# Patient Record
Sex: MALE | Race: WHITE | NOT HISPANIC OR LATINO | Employment: FULL TIME | RURAL
[De-identification: names, ages, dates, MRNs, and addresses within clinical notes are randomized per-mention and may not be internally consistent; named-entity substitution may affect disease eponyms.]

---

## 2017-03-17 ENCOUNTER — OFFICE VISIT (OUTPATIENT)
Dept: RETAIL CLINIC | Facility: CLINIC | Age: 32
End: 2017-03-17

## 2017-03-17 VITALS
OXYGEN SATURATION: 97 % | RESPIRATION RATE: 18 BRPM | HEART RATE: 95 BPM | TEMPERATURE: 98.8 F | WEIGHT: 185.4 LBS | BODY MASS INDEX: 29.04 KG/M2

## 2017-03-17 DIAGNOSIS — J10.1 INFLUENZA A: Primary | ICD-10-CM

## 2017-03-17 DIAGNOSIS — J02.0 STREP PHARYNGITIS: ICD-10-CM

## 2017-03-17 LAB
EXPIRATION DATE: ABNORMAL
EXPIRATION DATE: NORMAL
FLUAV AG NPH QL: NORMAL
FLUBV AG NPH QL: NORMAL
INTERNAL CONTROL: ABNORMAL
INTERNAL CONTROL: NORMAL
Lab: ABNORMAL
Lab: NORMAL
S PYO AG THROAT QL: POSITIVE

## 2017-03-17 PROCEDURE — 87880 STREP A ASSAY W/OPTIC: CPT | Performed by: NURSE PRACTITIONER

## 2017-03-17 PROCEDURE — 99203 OFFICE O/P NEW LOW 30 MIN: CPT | Performed by: NURSE PRACTITIONER

## 2017-03-17 PROCEDURE — 87804 INFLUENZA ASSAY W/OPTIC: CPT | Performed by: NURSE PRACTITIONER

## 2017-03-17 RX ORDER — OMEPRAZOLE 20 MG/1
20 CAPSULE, DELAYED RELEASE ORAL DAILY
COMMUNITY

## 2017-03-17 RX ORDER — OSELTAMIVIR PHOSPHATE 75 MG/1
75 CAPSULE ORAL 2 TIMES DAILY
Qty: 10 CAPSULE | Refills: 0 | Status: SHIPPED | OUTPATIENT
Start: 2017-03-17 | End: 2017-03-22

## 2017-03-17 RX ORDER — ALBUTEROL SULFATE 90 UG/1
2 AEROSOL, METERED RESPIRATORY (INHALATION) EVERY 4 HOURS PRN
Qty: 1 INHALER | Refills: 0 | Status: SHIPPED | OUTPATIENT
Start: 2017-03-17 | End: 2017-04-16

## 2017-03-17 RX ORDER — AMOXICILLIN AND CLAVULANATE POTASSIUM 875; 125 MG/1; MG/1
1 TABLET, FILM COATED ORAL 2 TIMES DAILY
Qty: 20 TABLET | Refills: 0 | Status: SHIPPED | OUTPATIENT
Start: 2017-03-17 | End: 2017-03-27

## 2017-03-17 RX ORDER — DEXTROMETHORPHAN HYDROBROMIDE AND PROMETHAZINE HYDROCHLORIDE 15; 6.25 MG/5ML; MG/5ML
5 SYRUP ORAL 4 TIMES DAILY PRN
Qty: 120 ML | Refills: 0 | Status: SHIPPED | OUTPATIENT
Start: 2017-03-17 | End: 2017-03-27

## 2017-03-17 RX ORDER — IBUPROFEN 200 MG
200 TABLET ORAL EVERY 6 HOURS PRN
COMMUNITY

## 2017-03-17 NOTE — PROGRESS NOTES
Subjective   Shaan Dominguez is a 31 y.o. male.   Chief Complaint   Patient presents with   • Sore Throat   • Flu Symptoms      Sore Throat    This is a new problem. The current episode started yesterday. The problem has been gradually worsening. Associated symptoms include congestion, coughing, headaches and trouble swallowing. He has tried acetaminophen for the symptoms. The treatment provided no relief.   Flu Symptoms   This is a new problem. The current episode started yesterday. The problem occurs constantly. Associated symptoms include chills, congestion, coughing, fatigue, a fever, headaches, myalgias and a sore throat. Exacerbated by: no flu shot this season. He has tried acetaminophen for the symptoms. The treatment provided no relief.        The following portions of the patient's history were reviewed and updated as appropriate: allergies, current medications, past family history, past medical history, past social history, past surgical history and problem list.    Review of Systems   Constitutional: Positive for chills, fatigue and fever.   HENT: Positive for congestion, sore throat and trouble swallowing.    Eyes: Negative.    Respiratory: Positive for cough and wheezing.    Gastrointestinal: Negative.    Genitourinary: Negative.    Musculoskeletal: Positive for myalgias.   Skin: Negative.    Allergic/Immunologic: Negative.    Neurological: Positive for headaches.   Psychiatric/Behavioral: Negative.    All other systems reviewed and are negative.      Objective   No Known Allergies    Physical Exam   Constitutional: He is oriented to person, place, and time. He appears well-developed and well-nourished. He appears ill.   HENT:   Right Ear: Tympanic membrane normal.   Left Ear: Tympanic membrane normal.   Nose: Mucosal edema present.   Mouth/Throat: Posterior oropharyngeal erythema present. No oropharyngeal exudate.   Eyes: Pupils are equal, round, and reactive to light.   Neck: Neck supple.    Cardiovascular: Normal rate and regular rhythm.    Pulmonary/Chest: Effort normal. No respiratory distress. He has wheezes. He has rhonchi.   Abdominal: Soft. Bowel sounds are normal.   Musculoskeletal: Normal range of motion.   Lymphadenopathy:     He has cervical adenopathy.   Neurological: He is alert and oriented to person, place, and time.   Skin: Skin is warm and dry.   Psychiatric: He has a normal mood and affect.   Vitals reviewed.      Assessment/Plan   Shaan was seen today for sore throat and flu symptoms.    Diagnoses and all orders for this visit:    Influenza A  -     POC Influenza A / B    Strep pharyngitis  -     POC Rapid Strep A    Other orders  -     oseltamivir (TAMIFLU) 75 MG capsule; Take 1 capsule by mouth 2 (Two) Times a Day for 5 days.  -     amoxicillin-clavulanate (AUGMENTIN) 875-125 MG per tablet; Take 1 tablet by mouth 2 (Two) Times a Day for 10 days.  -     albuterol (PROVENTIL HFA;VENTOLIN HFA) 108 (90 BASE) MCG/ACT inhaler; Inhale 2 puffs Every 4 (Four) Hours As Needed for Wheezing or Shortness of Air for up to 30 days.  -     promethazine-dextromethorphan (PROMETHAZINE-DM) 6.25-15 MG/5ML syrup; Take 5 mL by mouth 4 (Four) Times a Day As Needed for Cough for up to 10 days.        Results for orders placed or performed in visit on 03/17/17   POC Influenza A / B   Result Value Ref Range    Rapid Influenza A Ag pos     Rapid Influenza B Ag neg     Internal Control Passed Passed    Lot Number [id number]     Expiration Date 11/2018    POC Rapid Strep A   Result Value Ref Range    Rapid Strep A Screen Positive (A) Negative, VALID, INVALID, Not Performed    Internal Control Passed Passed    Lot Number [id number]     Expiration Date 8/2018               This document has been electronically signed by KALPESH Banks March 17, 2017 5:56 PM

## 2017-03-17 NOTE — PATIENT INSTRUCTIONS
Influenza, Adult  Influenza (flu) is an infection in the mouth, nose, and throat (respiratory tract) caused by a virus. The flu can make you feel very ill. Influenza spreads easily from person to person (contagious).   HOME CARE   · Only take medicines as told by your doctor.  · Use a cool mist humidifier to make breathing easier.  · Get plenty of rest until your fever goes away. This usually takes 3 to 4 days.  · Drink enough fluids to keep your pee (urine) clear or pale yellow.  · Cover your mouth and nose when you cough or sneeze.  · Wash your hands well to avoid spreading the flu.  · Stay home from work or school until your fever has been gone for at least 1 full day.  · Get a flu shot every year.  GET HELP RIGHT AWAY IF:   · You have trouble breathing or feel short of breath.  · Your skin or nails turn blue.  · You have severe neck pain or stiffness.  · You have a severe headache, facial pain, or earache.  · Your fever gets worse or keeps coming back.  · You feel sick to your stomach (nauseous), throw up (vomit), or have watery poop (diarrhea).  · You have chest pain.  · You have a deep cough that gets worse, or you cough up more thick spit (mucus).  MAKE SURE YOU:   · Understand these instructions.  · Will watch your condition.  · Will get help right away if you are not doing well or get worse.     This information is not intended to replace advice given to you by your health care provider. Make sure you discuss any questions you have with your health care provider.     Document Released: 09/26/2009 Document Revised: 01/08/2016 Document Reviewed: 10/11/2016  [employer] Interactive Patient Education ©2016 [employer]    Strep Throat  Strep throat is an infection of the throat. It is caused by germs. Strep throat spreads from person to person because of coughing, sneezing, or close contact.  HOME CARE  Medicines   · Take over-the-counter and prescription medicines only as told by your doctor.  · Take your  antibiotic medicine as told by your doctor. Do not stop taking the medicine even if you feel better.  · Have family members who also have a sore throat or fever go to a doctor.  Eating and Drinking   · Do not share food, drinking cups, or personal items.  · Try eating soft foods until your sore throat feels better.  · Drink enough fluid to keep your pee (urine) clear or pale yellow.  General Instructions  · Rinse your mouth (gargle) with a salt-water mixture 3-4 times per day or as needed. To make a salt-water mixture, stir ½-1 tsp of salt into 1 cup of warm water.  · Make sure that all people in your house wash their hands well.  · Rest.  · Stay home from school or work until you have been taking antibiotics for 24 hours.  · Keep all follow-up visits as told by your doctor. This is important.  GET HELP IF:  · Your neck keeps getting bigger.  · You get a rash, cough, or earache.  · You cough up thick liquid that is green, yellow-brown, or bloody.  · You have pain that does not get better with medicine.  · Your problems get worse instead of getting better.  · You have a fever.  GET HELP RIGHT AWAY IF:  · You throw up (vomit).  · You get a very bad headache.  · You neck hurts or it feels stiff.  · You have chest pain or you are short of breath.  · You have drooling, very bad throat pain, or changes in your voice.  · Your neck is swollen or the skin gets red and tender.  · Your mouth is dry or you are peeing less than normal.  · You keep feeling more tired or it is hard to wake up.  · Your joints are red or they hurt.     This information is not intended to replace advice given to you by your health care provider. Make sure you discuss any questions you have with your health care provider.     Document Released: 06/05/2009 Document Revised: 09/07/2016 Document Reviewed: 04/11/2016  [employer] Interactive Patient Education ©2016 [employer]

## 2018-04-26 ENCOUNTER — TRANSCRIBE ORDERS (OUTPATIENT)
Dept: ADMINISTRATIVE | Facility: HOSPITAL | Age: 33
End: 2018-04-26

## 2018-04-26 DIAGNOSIS — R05.9 COUGH: Primary | ICD-10-CM

## 2018-06-01 ENCOUNTER — HOSPITAL ENCOUNTER (OUTPATIENT)
Dept: RESPIRATORY THERAPY | Facility: HOSPITAL | Age: 33
Discharge: HOME | End: 2018-06-01
Admitting: PHYSICIAN ASSISTANT

## 2018-06-01 DIAGNOSIS — R05.9 COUGH: ICD-10-CM

## 2018-06-01 PROCEDURE — 94010 BREATHING CAPACITY TEST: CPT

## 2018-06-01 PROCEDURE — 94010 BREATHING CAPACITY TEST: CPT | Performed by: INTERNAL MEDICINE

## 2018-06-19 ENCOUNTER — OFFICE VISIT (OUTPATIENT)
Dept: RETAIL CLINIC | Facility: CLINIC | Age: 33
End: 2018-06-19

## 2018-06-19 VITALS
HEART RATE: 85 BPM | WEIGHT: 203 LBS | BODY MASS INDEX: 31.79 KG/M2 | TEMPERATURE: 97.9 F | OXYGEN SATURATION: 98 % | RESPIRATION RATE: 20 BRPM

## 2018-06-19 DIAGNOSIS — J01.00 ACUTE MAXILLARY SINUSITIS, RECURRENCE NOT SPECIFIED: ICD-10-CM

## 2018-06-19 DIAGNOSIS — J40 BRONCHITIS: Primary | ICD-10-CM

## 2018-06-19 PROCEDURE — 99213 OFFICE O/P EST LOW 20 MIN: CPT | Performed by: NURSE PRACTITIONER

## 2018-06-19 RX ORDER — ALBUTEROL SULFATE 90 UG/1
2 AEROSOL, METERED RESPIRATORY (INHALATION) EVERY 4 HOURS PRN
Qty: 1 INHALER | Refills: 0 | Status: SHIPPED | OUTPATIENT
Start: 2018-06-19 | End: 2018-07-19

## 2018-06-19 RX ORDER — MONTELUKAST SODIUM 10 MG/1
10 TABLET ORAL NIGHTLY
COMMUNITY

## 2018-06-19 RX ORDER — AMOXICILLIN AND CLAVULANATE POTASSIUM 875; 125 MG/1; MG/1
1 TABLET, FILM COATED ORAL 2 TIMES DAILY
Qty: 20 TABLET | Refills: 0 | Status: SHIPPED | OUTPATIENT
Start: 2018-06-19 | End: 2018-06-29

## 2018-06-19 RX ORDER — PREDNISONE 10 MG/1
TABLET ORAL
Qty: 21 TABLET | Refills: 0 | Status: SHIPPED | OUTPATIENT
Start: 2018-06-19

## 2018-06-19 RX ORDER — BENZONATATE 100 MG/1
100 CAPSULE ORAL 3 TIMES DAILY PRN
Qty: 30 CAPSULE | Refills: 0 | Status: SHIPPED | OUTPATIENT
Start: 2018-06-19 | End: 2018-06-29

## 2018-06-19 NOTE — PATIENT INSTRUCTIONS
Sinusitis, Adult  Sinusitis is soreness and inflammation of your sinuses. Sinuses are hollow spaces in the bones around your face. They are located:  · Around your eyes.  · In the middle of your forehead.  · Behind your nose.  · In your cheekbones.    Your sinuses and nasal passages are lined with a stringy fluid (mucus). Mucus normally drains out of your sinuses. When your nasal tissues get inflamed or swollen, the mucus can get trapped or blocked so air cannot flow through your sinuses. This lets bacteria, viruses, and funguses grow, and that leads to infection.  Follow these instructions at home:  Medicines  · Take, use, or apply over-the-counter and prescription medicines only as told by your doctor. These may include nasal sprays.  · If you were prescribed an antibiotic medicine, take it as told by your doctor. Do not stop taking the antibiotic even if you start to feel better.  Hydrate and Humidify  · Drink enough water to keep your pee (urine) clear or pale yellow.  · Use a cool mist humidifier to keep the humidity level in your home above 50%.  · Breathe in steam for 10-15 minutes, 3-4 times a day or as told by your doctor. You can do this in the bathroom while a hot shower is running.  · Try not to spend time in cool or dry air.  Rest  · Rest as much as possible.  · Sleep with your head raised (elevated).  · Make sure to get enough sleep each night.  General instructions  · Put a warm, moist washcloth on your face 3-4 times a day or as told by your doctor. This will help with discomfort.  · Wash your hands often with soap and water. If there is no soap and water, use hand .  · Do not smoke. Avoid being around people who are smoking (secondhand smoke).  · Keep all follow-up visits as told by your doctor. This is important.  Contact a doctor if:  · You have a fever.  · Your symptoms get worse.  · Your symptoms do not get better within 10 days.  Get help right away if:  · You have a very bad  headache.  · You cannot stop throwing up (vomiting).  · You have pain or swelling around your face or eyes.  · You have trouble seeing.  · You feel confused.  · Your neck is stiff.  · You have trouble breathing.  This information is not intended to replace advice given to you by your health care provider. Make sure you discuss any questions you have with your health care provider.  Document Released: 06/05/2009 Document Revised: 08/13/2017 Document Reviewed: 10/12/2016  [employer] Interactive Patient Education © 2018 [employer]    Acute Bronchitis, Adult  Acute bronchitis is when air tubes (bronchi) in the lungs suddenly get swollen. The condition can make it hard to breathe. It can also cause these symptoms:  · A cough.  · Coughing up clear, yellow, or green mucus.  · Wheezing.  · Chest congestion.  · Shortness of breath.  · A fever.  · Body aches.  · Chills.  · A sore throat.    Follow these instructions at home:  Medicines  · Take over-the-counter and prescription medicines only as told by your doctor.  · If you were prescribed an antibiotic medicine, take it as told by your doctor. Do not stop taking the antibiotic even if you start to feel better.  General instructions  · Rest.  · Drink enough fluids to keep your pee (urine) clear or pale yellow.  · Avoid smoking and secondhand smoke. If you smoke and you need help quitting, ask your doctor. Quitting will help your lungs heal faster.  · Use an inhaler, cool mist vaporizer, or humidifier as told by your doctor.  · Keep all follow-up visits as told by your doctor. This is important.  How is this prevented?  To lower your risk of getting this condition again:  · Wash your hands often with soap and water. If you cannot use soap and water, use hand .  · Avoid contact with people who have cold symptoms.  · Try not to touch your hands to your mouth, nose, or eyes.  · Make sure to get the flu shot every year.    Contact a doctor if:  · Your symptoms do not  get better in 2 weeks.  Get help right away if:  · You cough up blood.  · You have chest pain.  · You have very bad shortness of breath.  · You become dehydrated.  · You faint (pass out) or keep feeling like you are going to pass out.  · You keep throwing up (vomiting).  · You have a very bad headache.  · Your fever or chills gets worse.  This information is not intended to replace advice given to you by your health care provider. Make sure you discuss any questions you have with your health care provider.  Document Released: 06/05/2009 Document Revised: 07/26/2017 Document Reviewed: 06/07/2017  [employer] Interactive Patient Education © 2018 [employer]

## 2018-06-19 NOTE — PROGRESS NOTES
Subjective   Shaan Dominguez is a 32 y.o. male.   Chief Complaint   Patient presents with   • Bronchitis   • Sinusitis      Bronchitis   This is a recurrent problem. The current episode started in the past 7 days. The problem occurs constantly. The problem has been gradually worsening. Associated symptoms include congestion, coughing and headaches. Associated symptoms comments: Wheezing, chest tightness, sinus drainage. The symptoms are aggravated by exertion. Treatments tried: albuterol inhaler and dulera. The treatment provided mild relief.   Sinusitis   This is a new problem. The current episode started in the past 7 days. The problem has been gradually worsening since onset. There has been no fever. Associated symptoms include congestion, coughing, headaches and sinus pressure. Pertinent negatives include no shortness of breath. Past treatments include nothing. The treatment provided no relief.        The following portions of the patient's history were reviewed and updated as appropriate: allergies, current medications, past family history, past medical history, past social history, past surgical history and problem list.    Review of Systems   Constitutional: Negative.    HENT: Positive for congestion, postnasal drip, sinus pain and sinus pressure.    Eyes: Negative.    Respiratory: Positive for cough, chest tightness and wheezing. Negative for shortness of breath.    Gastrointestinal: Negative.    Genitourinary: Negative.    Skin: Negative.    Allergic/Immunologic: Negative.    Neurological: Positive for headaches.   Psychiatric/Behavioral: Negative.    All other systems reviewed and are negative.      Objective   No Known Allergies    Physical Exam   Constitutional: He is oriented to person, place, and time. He appears well-developed and well-nourished.   HENT:   Right Ear: Tympanic membrane normal.   Left Ear: Tympanic membrane normal.   Nose: Mucosal edema present. Right sinus exhibits maxillary sinus  tenderness. Left sinus exhibits maxillary sinus tenderness.   Mouth/Throat: Posterior oropharyngeal erythema present.   Mucoid cobblestoning   Eyes: Pupils are equal, round, and reactive to light.   Neck: Neck supple.   Cardiovascular: Normal rate and regular rhythm.    Pulmonary/Chest: Effort normal. He has wheezes in the right upper field, the right middle field, the right lower field, the left upper field and the left lower field.   Coarse breath sounds throughout   Neurological: He is alert and oriented to person, place, and time.   Skin: Skin is warm and dry.   Psychiatric: He has a normal mood and affect.   Vitals reviewed.      Assessment/Plan   Shaan was seen today for bronchitis and sinusitis.    Diagnoses and all orders for this visit:    Bronchitis    Acute maxillary sinusitis, recurrence not specified    Other orders  -     amoxicillin-clavulanate (AUGMENTIN) 875-125 MG per tablet; Take 1 tablet by mouth 2 (Two) Times a Day for 10 days.  -     predniSONE (DELTASONE) 10 MG tablet; Take as directed per 6 day pred sunil  -     albuterol (PROVENTIL HFA;VENTOLIN HFA) 108 (90 Base) MCG/ACT inhaler; Inhale 2 puffs Every 4 (Four) Hours As Needed for Wheezing or Shortness of Air (cough) for up to 30 days.  -     benzonatate (TESSALON) 100 MG capsule; Take 1 capsule by mouth 3 (Three) Times a Day As Needed for Cough for up to 10 days.                 This document has been electronically signed by KALPESH Banks June 19, 2018 11:56 AM

## 2018-07-24 ENCOUNTER — OFFICE VISIT (OUTPATIENT)
Dept: PULMONOLOGY | Facility: CLINIC | Age: 33
End: 2018-07-24

## 2018-07-24 VITALS
HEIGHT: 67 IN | DIASTOLIC BLOOD PRESSURE: 88 MMHG | WEIGHT: 209 LBS | HEART RATE: 84 BPM | BODY MASS INDEX: 32.8 KG/M2 | OXYGEN SATURATION: 96 % | SYSTOLIC BLOOD PRESSURE: 135 MMHG

## 2018-07-24 DIAGNOSIS — J41.0 SIMPLE CHRONIC BRONCHITIS (HCC): ICD-10-CM

## 2018-07-24 DIAGNOSIS — G47.33 OSA (OBSTRUCTIVE SLEEP APNEA): Primary | ICD-10-CM

## 2018-07-24 PROCEDURE — 99204 OFFICE O/P NEW MOD 45 MIN: CPT | Performed by: INTERNAL MEDICINE

## 2018-07-24 RX ORDER — ALBUTEROL SULFATE 90 UG/1
2 AEROSOL, METERED RESPIRATORY (INHALATION) EVERY 4 HOURS PRN
Qty: 1 INHALER | Refills: 11 | Status: SHIPPED | OUTPATIENT
Start: 2018-07-24

## 2018-07-24 RX ORDER — BENZONATATE 100 MG/1
100 CAPSULE ORAL 3 TIMES DAILY PRN
COMMUNITY

## 2018-07-24 NOTE — PROGRESS NOTES
Subjective   Shaan Dominguez is a 32 y.o. male who is being seen for Cough    History of Present Illness   This 32-year-old gentleman has been referred to us by his primary care provider for evaluation of ongoing cough and shortness of breath.  Patient tells me that he smokes and had some smoker's cough in the past but now his cough is more pronounced although still nonproductive.  He has developed episodic wheezing on and off since last year.  Now his main problem is chest tightness which usually starts on exertion and later on if the wheezing.  He also mentions about progressively increasing fatigue in the daytime.  Extent that that is interfering with his day-to-day activities.  On questioning he tells me that he snores loudly and has very much fragmented sleep.  Does not feel fresh in the morning time and remains sleepy and tired throughout the day.  The the weekend he tries to take naps.  Past Medical History:   Diagnosis Date   • Bronchitis    • Bronchitis, chronic (CMS/HCC)    • History of ear infections    • History of strep sore throat    • Hypertension      History reviewed. No pertinent surgical history.  Family History   Problem Relation Age of Onset   • Diabetes Mother    • Hypertension Father    • Diabetes Father    • Diabetes Maternal Grandmother    • Diabetes Paternal Grandmother       reports that he has been smoking Cigarettes.  He has a 15.00 pack-year smoking history. His smokeless tobacco use includes Snuff. He reports that he drinks about 9.0 oz of alcohol per week . He reports that he does not use drugs.  No Known Allergies        Patient has never received a flu shot or a pneumonia vaccination.     The following portions of the patient's history were reviewed and updated as appropriate: allergies, current medications, past family history, past medical history, past social history, past surgical history and problem list.    Review of Systems   Constitutional: Negative for appetite change, chills,  "diaphoresis and unexpected weight change.   HENT: Positive for voice change. Negative for sore throat and trouble swallowing.    Eyes: Negative for visual disturbance.   Respiratory: Positive for cough, chest tightness (Occasional), shortness of breath and wheezing. Negative for apnea and choking.    Cardiovascular: Negative for chest pain, palpitations and leg swelling.   Gastrointestinal: Negative for abdominal pain, constipation, diarrhea, nausea and vomiting.   Endocrine: Negative for cold intolerance, heat intolerance, polydipsia, polyphagia and polyuria.   Genitourinary: Negative for difficulty urinating and dysuria.   Musculoskeletal: Negative for gait problem.   Skin: Negative for rash and wound.   Neurological: Negative for syncope and light-headedness.   Hematological: Negative for adenopathy.   Psychiatric/Behavioral: Negative for agitation, behavioral problems and confusion.   All other systems reviewed and are negative.      Objective   /88   Pulse 84   Ht 170.2 cm (67\")   Wt 94.8 kg (209 lb)   SpO2 96%   BMI 32.73 kg/m²   Physical Exam   Constitutional: He is oriented to person, place, and time.   HENT:   Head: Normocephalic and atraumatic.   Nose: Mucosal edema present.   Eyes: Pupils are equal, round, and reactive to light. EOM are normal.   Neck: Neck supple.   Cardiovascular: Normal rate, regular rhythm and normal heart sounds.    Pulmonary/Chest: He has rhonchi.   Vesicular breath sound bilaterally with prolonged expiratory phase   Abdominal: Soft. Bowel sounds are normal.   Musculoskeletal: Normal range of motion. He exhibits no deformity.   Neurological: He is alert and oriented to person, place, and time.   Skin: Skin is warm and dry.   Psychiatric: He has a normal mood and affect. His behavior is normal.   Nursing note and vitals reviewed.        Radiology:  No Images in the past 120 days found..    Lab Results:  Office Visit on 03/17/2017   Component Date Value Ref Range Status   • " Rapid Influenza A Ag 03/17/2017 pos   Final   • Rapid Influenza B Ag 03/17/2017 neg   Final   • Internal Control 03/17/2017 Passed  Passed Final   • Lot Number 03/17/2017 [id number]   Final   • Expiration Date 03/17/2017 11/2018   Final   • Rapid Strep A Screen 03/17/2017 Positive* Negative, VALID, INVALID, Not Performed Final   • Internal Control 03/17/2017 Passed  Passed Final   • Lot Number 03/17/2017 [id number]   Final   • Expiration Date 03/17/2017 8/2018   Final       Assessment      ICD-10-CM ICD-9-CM   1. TMEITOPE (obstructive sleep apnea) G47.33 327.23   2. Simple chronic bronchitis (CMS/HCC) J41.0 491.0                DISCUSSION:  This 32-year-old smoker has symptoms of ongoing shortness of breath, chest tightness, cough and wheezing.  This symptoms are consistent with obstructive airways disease.  He also has coexisting problem off fragmented sleep with increased daytime sleepiness and fatigue.  This may be from underlying obstructive sleep apnea.  We are initiating some workup prior to our management plan.  I'm sending him for a pulmonary function test and a nocturnal polysomnography.  I like to see him again after that.    Patient currently takes albuterol inhaler on a when necessary basis.  I'm adding Breo microgram 1 puff daily for regular maintenance therapy.    Plan    Orders Placed This Encounter   Procedures   • Ambulatory Referral to Sleep Medicine   • Pulmonary Function Test     New Medications Ordered This Visit   Medications   • albuterol (PROAIR HFA) 108 (90 Base) MCG/ACT inhaler     Sig: Inhale 2 puffs Every 4 (Four) Hours As Needed for Shortness of Air.     Dispense:  1 inhaler     Refill:  11   • Fluticasone Furoate-Vilanterol (BREO ELLIPTA) 100-25 MCG/INH aerosol powder      Sig: Inhale 1 puff Daily.     Dispense:  1 each     Refill:  6                  Marichuy Lange MD, FCCP, [facility]  Pulmonary, Critical Care, and Sleep Medicine

## 2021-03-18 ENCOUNTER — BULK ORDERING (OUTPATIENT)
Dept: CASE MANAGEMENT | Facility: OTHER | Age: 36
End: 2021-03-18

## 2021-03-18 DIAGNOSIS — Z23 IMMUNIZATION DUE: ICD-10-CM

## 2024-06-13 ENCOUNTER — APPOINTMENT (OUTPATIENT)
Dept: MRI IMAGING | Facility: HOSPITAL | Age: 39
End: 2024-06-13
Payer: COMMERCIAL

## 2024-06-13 ENCOUNTER — HOSPITAL ENCOUNTER (EMERGENCY)
Facility: HOSPITAL | Age: 39
Discharge: HOME | End: 2024-06-13
Attending: STUDENT IN AN ORGANIZED HEALTH CARE EDUCATION/TRAINING PROGRAM
Payer: COMMERCIAL

## 2024-06-13 VITALS
HEART RATE: 83 BPM | DIASTOLIC BLOOD PRESSURE: 87 MMHG | SYSTOLIC BLOOD PRESSURE: 127 MMHG | OXYGEN SATURATION: 97 % | TEMPERATURE: 97.9 F | RESPIRATION RATE: 17 BRPM | HEIGHT: 67 IN | BODY MASS INDEX: 34.53 KG/M2 | WEIGHT: 220 LBS

## 2024-06-13 DIAGNOSIS — S86.111A GASTROCNEMIUS TEAR, RIGHT, INITIAL ENCOUNTER: Primary | ICD-10-CM

## 2024-06-13 PROCEDURE — 73718 MRI LOWER EXTREMITY W/O DYE: CPT | Performed by: RADIOLOGY

## 2024-06-13 PROCEDURE — 99284 EMERGENCY DEPT VISIT MOD MDM: CPT

## 2024-06-13 PROCEDURE — 73718 MRI LOWER EXTREMITY W/O DYE: CPT

## 2024-06-13 RX ORDER — DICLOFENAC SODIUM 75 MG/1
75 TABLET, DELAYED RELEASE ORAL 2 TIMES DAILY PRN
Qty: 12 TABLET | Refills: 0 | Status: SHIPPED | OUTPATIENT
Start: 2024-06-13

## 2024-06-13 NOTE — ED PROVIDER NOTES
Subjective   History of Present Illness  39 yo male pt with hx of HTN presents to the ED with complaints of right lower leg pain. Pt states that he was hit with a baseball last night.  Pt states he thinks he has tore his calf muscle.  States he has pain with extension and flexion of his foot and ankle. Pt states he has worsening pain with walking too. Rest alleviates the pain. Denies any other injuries or concerns.      History provided by:  Patient   used: No        Review of Systems   Constitutional: Negative.    HENT: Negative.     Eyes: Negative.    Respiratory: Negative.     Cardiovascular: Negative.    Gastrointestinal: Negative.    Endocrine: Negative.    Genitourinary: Negative.    Musculoskeletal:         R leg pain    Skin: Negative.    Allergic/Immunologic: Negative.    Neurological: Negative.    Hematological: Negative.    Psychiatric/Behavioral: Negative.     All other systems reviewed and are negative.      Past Medical History:   Diagnosis Date    Bronchitis     Bronchitis, chronic     History of ear infections     History of strep sore throat     Hypertension        No Known Allergies    No past surgical history on file.    Family History   Problem Relation Age of Onset    Diabetes Mother     Hypertension Father     Diabetes Father     Diabetes Maternal Grandmother     Diabetes Paternal Grandmother        Social History     Socioeconomic History    Marital status:    Tobacco Use    Smoking status: Every Day     Current packs/day: 1.00     Average packs/day: 1 pack/day for 15.0 years (15.0 ttl pk-yrs)     Types: Cigarettes    Smokeless tobacco: Current     Types: Snuff   Substance and Sexual Activity    Alcohol use: Yes     Alcohol/week: 15.0 standard drinks of alcohol     Types: 15 Cans of beer per week    Drug use: No    Sexual activity: Defer     Comment:             Objective   Physical Exam  Vitals and nursing note reviewed.   Constitutional:       Appearance:  Normal appearance. He is normal weight.   HENT:      Head: Normocephalic and atraumatic.      Right Ear: External ear normal.      Left Ear: External ear normal.      Nose: Nose normal.      Mouth/Throat:      Mouth: Mucous membranes are moist.      Pharynx: Oropharynx is clear.   Eyes:      Extraocular Movements: Extraocular movements intact.      Conjunctiva/sclera: Conjunctivae normal.      Pupils: Pupils are equal, round, and reactive to light.   Cardiovascular:      Rate and Rhythm: Normal rate and regular rhythm.      Pulses: Normal pulses.      Heart sounds: Normal heart sounds.   Pulmonary:      Effort: Pulmonary effort is normal.      Breath sounds: Normal breath sounds.   Abdominal:      General: Abdomen is flat. Bowel sounds are normal.      Palpations: Abdomen is soft.   Musculoskeletal:         General: Normal range of motion.      Cervical back: Normal range of motion and neck supple.      Right lower leg: Tenderness present. No swelling or bony tenderness. No edema.        Legs:       Comments: N/V intact. Pt is ambulatory. No bruising.    Skin:     General: Skin is warm and dry.      Capillary Refill: Capillary refill takes less than 2 seconds.   Neurological:      General: No focal deficit present.      Mental Status: He is alert and oriented to person, place, and time. Mental status is at baseline.   Psychiatric:         Mood and Affect: Mood normal.         Behavior: Behavior normal.         Thought Content: Thought content normal.         Judgment: Judgment normal.         Procedures           ED Course  ED Course as of 06/13/24 1314   u Jun 13, 2024   1045 Discussed with radiologist Dr. Sharma- sina MR  [ML]   1301 MRI Tibia Fibula Right Without Contrast     IMPRESSION:  1.  There is fluid interposing the soleus and medial head gastrocnemius  musculature as well as edema of the gastrocnemius consistent with  high-grade muscle tear/strain.  2.  The Achilles tendon is intact.        This  report was finalized on 6/13/2024 12:57 PM by Dr. Manuel Sharma MD.           Specimen Collected: 06/13/24 12:50 EDT Last Resulted: 06/13/24 12:57 EDT         [ML]   1308 PT educated. He will f/u with PCP and ortho tomorrow. Discussed sx and red flags that would warrant return to the ED.  [ML]      ED Course User Index  [ML] Fabiana Barrera PA                                 Results for orders placed or performed in visit on 03/17/17   POC Influenza A / B    Specimen: Swab   Result Value Ref Range    Rapid Influenza A Ag pos     Rapid Influenza B Ag neg     Internal Control Passed Passed    Lot Number [id number]     Expiration Date 11/2,018    POC Rapid Strep A    Specimen: Other   Result Value Ref Range    Rapid Strep A Screen Positive (A) Negative, VALID, INVALID, Not Performed    Internal Control Passed Passed    Lot Number [id number]     Expiration Date 8/2,018      MRI Tibia Fibula Right Without Contrast    Result Date: 6/13/2024  1.  There is fluid interposing the soleus and medial head gastrocnemius musculature as well as edema of the gastrocnemius consistent with high-grade muscle tear/strain. 2.  The Achilles tendon is intact.   This report was finalized on 6/13/2024 12:57 PM by Dr. Manuel Sharma MD.                   Medical Decision Making  39 yo male pt with hx of HTN presents to the ED with complaints of right lower leg pain. Pt states that he was hit with a baseball last night.  Pt states he thinks he has tore his calf muscle.  States he has pain with extension and flexion of his foot and ankle. Pt states he has worsening pain with walking too. Rest alleviates the pain. Denies any other injuries or concerns.  ED stay uncomplicated. Discussed findings and recommendations with patient. Discussed sx and red flags that would warrant return to the ED.  Pt will f/u with ortho and PCP tomorrow.      Amount and/or Complexity of Data Reviewed  Radiology: ordered. Decision-making details documented in ED  Course.        Final diagnoses:   Gastrocnemius tear, right, initial encounter       ED Disposition  ED Disposition       ED Disposition   Discharge    Condition   Stable    Comment   --               Star Sanz MD  [address] [place] [address] [phone]    Schedule an appointment as soon as possible for a visit in 1 day           Medication List        New Prescriptions      diclofenac 75 MG EC tablet  Commonly known as: VOLTAREN  Take 1 tablet by mouth 2 (Two) Times a Day As Needed (pain\).               Where to Get Your Medications        These medications were sent to [facility] [address] [place] [address] - [phone]  - [phone]   [address] [address]      Phone: [phone]   diclofenac 75 MG EC tablet            Fabiana Barrera PA  06/13/24 6996

## 2024-06-13 NOTE — DISCHARGE INSTRUCTIONS
Heel lifts decrease the stretch of the calf muscles, thereby reducing pain, and may be beneficial initially. You should apply ice to the injured area four times per day for 20 minutes at a time until swelling subsides. Nonsteroidal antiinflammatory medication or acetaminphen can be used as needed for pain.  Compression sleeves worn over calf may also help.  You should follow up with ortho. Return to the ED if your pain and swelling worsens or if you develop new symptoms such as Chest pain or shortness of breath.      Call one of the offices below to establish a primary care provider.  If you are unable to get an appointment and feel it is an emergency and need to be seen immediately please return to the Emergency Department    Call one of the officee below to set up a primary care provider.       Dr. Roth  [address] [facility] [id number] [phone]    [facility] ([facility])  [address] [place] [address] [address]  [facility] [address] [phone]    [facility] ([place])                                                                                                       [address] [facility] [id number] [phone]    [facility])  [address]   [address]  [facility] [id number] [phone]    [facility])  [address]  [place] [address]  Phone: [phone]    [facility]  [address] [facility] [id number] [phone]    [facility])  [address] [place] [address]  Phone: [phone]    Dr. Madeleine Oliveira  [address] [address]  [facility] [id number] [phone]    [facility] [address]  [phone]    [facility]  Mae Alcantara, APRN  [address] [id number]  [phone]    Dr. Barkley and [facility]   [address] [facility]  [address]  [place]  [address]  [phone]

## 2024-08-20 ENCOUNTER — HOSPITAL ENCOUNTER (OUTPATIENT)
Dept: GENERAL RADIOLOGY | Facility: HOSPITAL | Age: 39
Discharge: HOME | End: 2024-08-20
Payer: COMMERCIAL

## 2024-08-20 ENCOUNTER — TRANSCRIBE ORDERS (OUTPATIENT)
Dept: ADMINISTRATIVE | Facility: HOSPITAL | Age: 39
End: 2024-08-20
Payer: COMMERCIAL
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
## 2024-08-20 PROCEDURE — 72110 X-RAY EXAM L-2 SPINE 4/>VWS: CPT

## 2024-08-20 PROCEDURE — 72070 X-RAY EXAM THORAC SPINE 2VWS: CPT
